# Patient Record
Sex: MALE | Race: WHITE | NOT HISPANIC OR LATINO | URBAN - METROPOLITAN AREA
[De-identification: names, ages, dates, MRNs, and addresses within clinical notes are randomized per-mention and may not be internally consistent; named-entity substitution may affect disease eponyms.]

---

## 2021-05-14 ENCOUNTER — IMMUNIZATIONS (OUTPATIENT)
Dept: FAMILY MEDICINE CLINIC | Facility: HOSPITAL | Age: 71
End: 2021-05-14

## 2021-05-14 DIAGNOSIS — Z23 ENCOUNTER FOR IMMUNIZATION: Primary | ICD-10-CM

## 2021-05-14 PROCEDURE — 91300 SARS-COV-2 / COVID-19 MRNA VACCINE (PFIZER-BIONTECH) 30 MCG: CPT

## 2021-05-14 PROCEDURE — 0001A SARS-COV-2 / COVID-19 MRNA VACCINE (PFIZER-BIONTECH) 30 MCG: CPT

## 2021-06-06 ENCOUNTER — IMMUNIZATIONS (OUTPATIENT)
Dept: FAMILY MEDICINE CLINIC | Facility: HOSPITAL | Age: 71
End: 2021-06-06

## 2021-06-06 DIAGNOSIS — Z23 ENCOUNTER FOR IMMUNIZATION: Primary | ICD-10-CM

## 2021-06-06 PROCEDURE — 91300 SARS-COV-2 / COVID-19 MRNA VACCINE (PFIZER-BIONTECH) 30 MCG: CPT

## 2021-06-06 PROCEDURE — 0002A SARS-COV-2 / COVID-19 MRNA VACCINE (PFIZER-BIONTECH) 30 MCG: CPT

## 2024-07-01 ENCOUNTER — OFFICE VISIT (OUTPATIENT)
Age: 74
End: 2024-07-01
Payer: COMMERCIAL

## 2024-07-01 VITALS
WEIGHT: 207.6 LBS | HEART RATE: 75 BPM | OXYGEN SATURATION: 95 % | DIASTOLIC BLOOD PRESSURE: 60 MMHG | BODY MASS INDEX: 29.06 KG/M2 | SYSTOLIC BLOOD PRESSURE: 110 MMHG | HEIGHT: 71 IN | TEMPERATURE: 96.2 F

## 2024-07-01 DIAGNOSIS — I10 HYPERTENSION, UNSPECIFIED TYPE: ICD-10-CM

## 2024-07-01 DIAGNOSIS — J30.81 ALLERGIC RHINITIS DUE TO ANIMAL HAIR AND DANDER: ICD-10-CM

## 2024-07-01 DIAGNOSIS — J45.909 UNCOMPLICATED ASTHMA, UNSPECIFIED ASTHMA SEVERITY, UNSPECIFIED WHETHER PERSISTENT: Primary | ICD-10-CM

## 2024-07-01 DIAGNOSIS — M15.9 OSTEOARTHRITIS OF MULTIPLE JOINTS, UNSPECIFIED OSTEOARTHRITIS TYPE: ICD-10-CM

## 2024-07-01 PROCEDURE — G2211 COMPLEX E/M VISIT ADD ON: HCPCS | Performed by: INTERNAL MEDICINE

## 2024-07-01 PROCEDURE — 99204 OFFICE O/P NEW MOD 45 MIN: CPT | Performed by: INTERNAL MEDICINE

## 2024-07-01 RX ORDER — DIPHENOXYLATE HYDROCHLORIDE AND ATROPINE SULFATE 2.5; .025 MG/1; MG/1
1 TABLET ORAL DAILY
COMMUNITY

## 2024-07-01 RX ORDER — MONTELUKAST SODIUM 10 MG/1
10 TABLET ORAL
COMMUNITY

## 2024-07-01 RX ORDER — VALSARTAN AND HYDROCHLOROTHIAZIDE 160; 25 MG/1; MG/1
1 TABLET ORAL DAILY
COMMUNITY

## 2024-07-01 RX ORDER — ALBUTEROL SULFATE 90 UG/1
2 AEROSOL, METERED RESPIRATORY (INHALATION) EVERY 6 HOURS PRN
Qty: 18 G | Refills: 2 | Status: SHIPPED | OUTPATIENT
Start: 2024-07-01

## 2024-07-01 RX ORDER — FLUTICASONE FUROATE AND VILANTEROL 200; 25 UG/1; UG/1
1 POWDER RESPIRATORY (INHALATION) DAILY
Qty: 60 BLISTER | Refills: 2 | Status: SHIPPED | OUTPATIENT
Start: 2024-07-01 | End: 2024-09-29

## 2024-07-01 RX ORDER — FLUTICASONE FUROATE AND VILANTEROL 200; 25 UG/1; UG/1
1 POWDER RESPIRATORY (INHALATION) DAILY
COMMUNITY
End: 2024-07-01 | Stop reason: SDUPTHER

## 2024-07-01 NOTE — PROGRESS NOTES
Pulmonary Consultation   Issa Albright 73 y.o. male MRN: [id number]  7/1/2024      Chief Complaint:  Asthma    HPI:    73-year-old male with past medical history of asthma, hypertension, osteoarthritis and allergic rhinitis presents for evaluation of asthma.  Patient was previously treated at [facility] for asthma.  Patient states that he currently feels well but occasionally feels suddenly short of breath depending on exposures such as strong smells (such as Axe deodorant and Febreeze) or pollen and animal dander.  Patient denies any current or previous tobacco history.  He denies any illicit drug use.  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  No occupational exposures.  He owns cats and states that he knows that he is allergic to them.    Exercise Tolerance: Fair.  Able to do most exercise without stopping from exertion however states that when carrying heavy things upstairs he needs to stop prior to reaching to top.      Past Medical Hx  Asthma, unclear severity  Allergic rhinitis  Hypertension  Osteoarthritis      Past Surgical Hx  No thoracic surgery endorsed by patient      Family Hx  No family history of pulmonary disease per patient      Occupational History:   No known previous inhalation injuries      Social History:   Lifelong non-smoker  Denies any illicit drug use       Pertinent Meds  Flonase nasal spray daily  Breo 200 daily  Albuterol as needed      ROS:  Constitutional: - Fatigue, - chills, - fever, - weight change.   HEENT: - rhinorrhea, - sneezing, - sore throat.    Respiratory: - cough, - sputum production, + intermittent shortness of breath, - wheezing.    Cardiovascular: - chest pain,  -palpitations, - leg swelling.   Gastrointestinal: - abdominal pain, - constipation, - diarrhea, - nausea, - vomiting.   Endocrine: - cold intolerance, - heat intolerance.   Genitourinary: - dysuria.   Musculoskeletal: - arthralgias.   Skin:- rash, - wound.  "  Allergic/Immunologic: - allergies  Neurological: - dizziness, - numbness      Vitals: Blood pressure 110/60, pulse 75, temperature (!) 96.2 °F (35.7 °C), height 5' 11\" (1.803 m), weight 94.2 kg (207 lb 9.6 oz), SpO2 95%., Body mass index is 28.95 kg/m².    Physical Exam  GEN  NAD  HEENT  ncat, non icteric, MM moist  NECK  supple, no JVD, no LAD  CV  +s1s2, no mrg, RRR  PULM  CTA BL, no wrr  ABD  soft, ntnd, + BS  EXT 1+ lower extremity pitting edema, no cyanosis, no clubbing  NEURO  Aox3, no focal weakness    Imaging and other studies  No thoracic imaging available for interpretation      Pulmonary function testing:   No PFTs available for interpretation      Lab Results, EKG, Pathology, and Other Studies:  No labs available for interpretation    Assessment:  Asthma-unclear severity  Allergic rhinitis  Hypertension  Osteoarthritis    Plan:  Patient symptoms are consistent with asthma.  Check CBC with differential  Check [place] allergy panel  Check chest x-ray  Check PFTs with 6-minute walk test  Continue Breo 200 daily.  Reorder for 3 months.  Continue to use albuterol as needed.  Reordered for at least 3 months.  Continue Flonase nasal spray daily for allergic rhinitis  Will defer management of hypertension and osteoarthritis to patient's primary care physician.  Patient currently using valsartan-HCTZ as well as acetaminophen as needed for these diagnoses, respectively  Patient will call us for any changes or worsening of pulmonary symptoms prior to next planned visit.    Return visit in 4 to 6 weeks  On next visit we will evaluate symptoms, albuterol requirement, CBC results, [facility] allergy panel results, chest x-ray results, PFT results, compliance with Breo    Note: Portions of the record may have been created with voice recognition software. Occasional wrong word or \"sound a like\" substitutions may have occurred due to the inherent limitations of voice recognition software. Read the chart carefully and " recognize, using context, where substitutions have occurred.     Mark Medina M.D.  [facility]

## 2024-09-12 DIAGNOSIS — J45.909 UNCOMPLICATED ASTHMA, UNSPECIFIED ASTHMA SEVERITY, UNSPECIFIED WHETHER PERSISTENT: ICD-10-CM

## 2024-09-12 NOTE — TELEPHONE ENCOUNTER
Albuterol as needed for wheezing last ordered 07.2024 with refills - Please review to see if the refill is appropriate.

## 2024-09-16 RX ORDER — ALBUTEROL SULFATE 90 UG/1
INHALANT RESPIRATORY (INHALATION)
Qty: 8.5 G | Refills: 2 | Status: SHIPPED | OUTPATIENT
Start: 2024-09-16

## 2024-09-22 DIAGNOSIS — J45.909 UNCOMPLICATED ASTHMA, UNSPECIFIED ASTHMA SEVERITY, UNSPECIFIED WHETHER PERSISTENT: ICD-10-CM

## 2024-09-23 RX ORDER — FLUTICASONE FUROATE AND VILANTEROL TRIFENATATE 200; 25 UG/1; UG/1
POWDER RESPIRATORY (INHALATION)
Qty: 60 EACH | Refills: 2 | Status: SHIPPED | OUTPATIENT
Start: 2024-09-23

## 2024-09-27 ENCOUNTER — HOSPITAL ENCOUNTER (OUTPATIENT)
Dept: PULMONOLOGY | Facility: HOSPITAL | Age: 74
End: 2024-09-27
Attending: INTERNAL MEDICINE
Payer: COMMERCIAL

## 2024-09-27 ENCOUNTER — APPOINTMENT (OUTPATIENT)
Dept: LAB | Facility: HOSPITAL | Age: 74
End: 2024-09-27
Payer: COMMERCIAL

## 2024-09-27 ENCOUNTER — HOSPITAL ENCOUNTER (OUTPATIENT)
Dept: RADIOLOGY | Facility: HOSPITAL | Age: 74
End: 2024-09-27
Payer: COMMERCIAL

## 2024-09-27 DIAGNOSIS — J45.909 UNCOMPLICATED ASTHMA, UNSPECIFIED ASTHMA SEVERITY, UNSPECIFIED WHETHER PERSISTENT: ICD-10-CM

## 2024-09-27 DIAGNOSIS — J45.909 ASTHMATIC BRONCHITIS WITHOUT COMPLICATION, UNSPECIFIED ASTHMA SEVERITY, UNSPECIFIED WHETHER PERSISTENT: ICD-10-CM

## 2024-09-27 LAB
BASOPHILS # BLD AUTO: 0.03 THOUSANDS/ΜL (ref 0–0.1)
BASOPHILS NFR BLD AUTO: 1 % (ref 0–1)
EOSINOPHIL # BLD AUTO: 0.21 THOUSAND/ΜL (ref 0–0.61)
EOSINOPHIL NFR BLD AUTO: 4 % (ref 0–6)
ERYTHROCYTE [DISTWIDTH] IN BLOOD BY AUTOMATED COUNT: 12.7 % (ref 11.6–15.1)
HCT VFR BLD AUTO: 44.1 % (ref 36.5–49.3)
HGB BLD-MCNC: 14.7 G/DL (ref 12–17)
IMM GRANULOCYTES # BLD AUTO: 0.03 THOUSAND/UL (ref 0–0.2)
IMM GRANULOCYTES NFR BLD AUTO: 1 % (ref 0–2)
LYMPHOCYTES # BLD AUTO: 1.34 THOUSANDS/ΜL (ref 0.6–4.47)
LYMPHOCYTES NFR BLD AUTO: 26 % (ref 14–44)
MCH RBC QN AUTO: 31.7 PG (ref 26.8–34.3)
MCHC RBC AUTO-ENTMCNC: 33.3 G/DL (ref 31.4–37.4)
MCV RBC AUTO: 95 FL (ref 82–98)
MONOCYTES # BLD AUTO: 0.3 THOUSAND/ΜL (ref 0.17–1.22)
MONOCYTES NFR BLD AUTO: 6 % (ref 4–12)
NEUTROPHILS # BLD AUTO: 3.24 THOUSANDS/ΜL (ref 1.85–7.62)
NEUTS SEG NFR BLD AUTO: 62 % (ref 43–75)
NRBC BLD AUTO-RTO: 0 /100 WBCS
PLATELET # BLD AUTO: 175 THOUSANDS/UL (ref 149–390)
PMV BLD AUTO: 10.2 FL (ref 8.9–12.7)
RBC # BLD AUTO: 4.64 MILLION/UL (ref 3.88–5.62)
WBC # BLD AUTO: 5.15 THOUSAND/UL (ref 4.31–10.16)

## 2024-09-27 PROCEDURE — 85025 COMPLETE CBC W/AUTO DIFF WBC: CPT

## 2024-09-27 PROCEDURE — 94618 PULMONARY STRESS TESTING: CPT

## 2024-09-27 PROCEDURE — 94729 DIFFUSING CAPACITY: CPT | Performed by: INTERNAL MEDICINE

## 2024-09-27 PROCEDURE — 86003 ALLG SPEC IGE CRUDE XTRC EA: CPT

## 2024-09-27 PROCEDURE — 94726 PLETHYSMOGRAPHY LUNG VOLUMES: CPT

## 2024-09-27 PROCEDURE — 71046 X-RAY EXAM CHEST 2 VIEWS: CPT

## 2024-09-27 PROCEDURE — 94726 PLETHYSMOGRAPHY LUNG VOLUMES: CPT | Performed by: INTERNAL MEDICINE

## 2024-09-27 PROCEDURE — 94060 EVALUATION OF WHEEZING: CPT

## 2024-09-27 PROCEDURE — 82785 ASSAY OF IGE: CPT

## 2024-09-27 PROCEDURE — 94060 EVALUATION OF WHEEZING: CPT | Performed by: INTERNAL MEDICINE

## 2024-09-27 PROCEDURE — 94729 DIFFUSING CAPACITY: CPT

## 2024-09-27 PROCEDURE — 94618 PULMONARY STRESS TESTING: CPT | Performed by: INTERNAL MEDICINE

## 2024-09-27 PROCEDURE — 36415 COLL VENOUS BLD VENIPUNCTURE: CPT

## 2024-09-27 RX ORDER — ALBUTEROL SULFATE 0.83 MG/ML
2.5 SOLUTION RESPIRATORY (INHALATION) ONCE
Status: COMPLETED | OUTPATIENT
Start: 2024-09-27 | End: 2024-09-27

## 2024-09-27 RX ADMIN — ALBUTEROL SULFATE 2.5 MG: 2.5 SOLUTION RESPIRATORY (INHALATION) at 10:04

## 2024-09-29 LAB
A ALTERNATA IGE QN: <0.1 KUA/I
A FUMIGATUS IGE QN: 0.17 KUA/I
BERMUDA GRASS IGE QN: 0.33 KUA/I
BOXELDER IGE QN: <0.1 KUA/I
C HERBARUM IGE QN: <0.1 KUA/I
CAT DANDER IGE QN: 4.95 KUA/I
CMN PIGWEED IGE QN: <0.1 KUA/I
COMMON RAGWEED IGE QN: <0.1 KUA/I
COTTONWOOD IGE QN: <0.1 KUA/I
D FARINAE IGE QN: 11.9 KUA/I
D PTERONYSS IGE QN: 14.7 KUA/I
DOG DANDER IGE QN: 0.32 KUA/I
LONDON PLANE IGE QN: <0.1 KUA/I
MOUSE URINE PROT IGE QN: <0.1 KUA/I
MT JUNIPER IGE QN: <0.1 KUA/I
MUGWORT IGE QN: <0.1 KUA/I
P NOTATUM IGE QN: <0.1 KUA/I
ROACH IGE QN: <0.1 KUA/I
SHEEP SORREL IGE QN: <0.1 KUA/I
SILVER BIRCH IGE QN: <0.1 KUA/I
TIMOTHY IGE QN: 6.26 KUA/I
TOTAL IGE SMQN RAST: 120 KU/L (ref 0–113)
WALNUT IGE QN: <0.1 KUA/I
WHITE ASH IGE QN: 1.1 KUA/I
WHITE ELM IGE QN: <0.1 KUA/I
WHITE MULBERRY IGE QN: <0.1 KUA/I
WHITE OAK IGE QN: <0.1 KUA/I

## 2024-10-18 ENCOUNTER — OFFICE VISIT (OUTPATIENT)
Age: 74
End: 2024-10-18
Payer: COMMERCIAL

## 2024-10-18 VITALS
TEMPERATURE: 96.9 F | OXYGEN SATURATION: 98 % | SYSTOLIC BLOOD PRESSURE: 136 MMHG | DIASTOLIC BLOOD PRESSURE: 90 MMHG | HEART RATE: 89 BPM | BODY MASS INDEX: 28.73 KG/M2 | WEIGHT: 206 LBS

## 2024-10-18 DIAGNOSIS — J45.40 MODERATE PERSISTENT ASTHMA WITHOUT COMPLICATION: Primary | ICD-10-CM

## 2024-10-18 DIAGNOSIS — J30.81 ALLERGIC RHINITIS DUE TO ANIMAL HAIR AND DANDER: ICD-10-CM

## 2024-10-18 DIAGNOSIS — I10 PRIMARY HYPERTENSION: ICD-10-CM

## 2024-10-18 PROCEDURE — 99214 OFFICE O/P EST MOD 30 MIN: CPT | Performed by: NURSE PRACTITIONER

## 2024-10-18 PROCEDURE — G2211 COMPLEX E/M VISIT ADD ON: HCPCS | Performed by: NURSE PRACTITIONER

## 2024-10-18 NOTE — ASSESSMENT & PLAN NOTE
Absolute eosinophils #210, IgE 120  He has some reversibility on PFT consistent with asthma and some air trapping is identified - this may be the main  for his shortness of breath.  Asthma is well controlled presently and he has rare need for rescue inhaler. No exacerbation since last visit.  Continue Breo  Albuterol HFA up to 4x per day if needed  We talked about trying to diminish allergen exposure to avoid pulmonary triggers  I gave incentive spirometry and instructions on how to use; He is asked to use several times per day.

## 2024-10-18 NOTE — PATIENT INSTRUCTIONS
You have a diagnosis of allergy to pet dander.  Keep pets out of bedroom 100%.  Keep bedroom door closed.  Consider an air filter or HEPA filter in common areas of the home.  After touching or handling animals, wash your skin immediately.  Speak with  about introducing foods that are allergen-reducing for your pet.    You are diagnosed with perennial allergies - dust mite allergy  Wash sheets and towels weekly in hot water and hot dryer.  Consider covering your bed pillows in allergy pillow cases; mattress cases are also available.  Consider a HEPA filter or air filter at your bedside.  Try to keep ambient humidity between 40-60%. Too moist means potential mold development, and too dry means dust mite dander becomes more airborne.    You have a diagnosis of seasonal allergies. You are allergic to pollen which has a blooming season that is predictable each year. Spring pollens: March 15 to July 1 annually  During the pollen season please do not hang clothes out to dry outdoors  Consider keeping the air conditioner on during your pollen season to provide air filtration  If spending time outdoors, wash eyelashes after coming indoors with warm water, mild soap, and a soft cloth.  Do not re-wear hooded clothes without washing pollen off.  Tie back hair, away from the face, prior to going outside. Wear sunglasses to protect your eyes.  Wipe down car interior surfaces and change cabin air filter per  recommendations.

## 2024-10-18 NOTE — PROGRESS NOTES
Pulmonary Follow-Up Note   Issa Albright 73 y.o. male MRN: [id number]  10/18/2024      Assessment/Plan:    Problem List Items Addressed This Visit       Uncomplicated asthma - Primary     Absolute eosinophils #210, IgE 120  He has some reversibility on PFT consistent with asthma and some air trapping is identified - this may be the main  for his shortness of breath.  Asthma is well controlled presently and he has rare need for rescue inhaler. No exacerbation since last visit.  Continue Breo  Albuterol HFA up to 4x per day if needed  We talked about trying to diminish allergen exposure to avoid pulmonary triggers  I gave incentive spirometry and instructions on how to use; He is asked to use several times per day.         Hypertension     On Valsartan/HCTZ; follow with PCP         Allergic rhinitis due to animal hair and dander     Serum IgE testing reveals positives for grass pollen, tree pollen, dust mite, and cat dander. Borderline positive for aspergillus mold and dog dander.    We did review allergy avoidance measures today - add dust mite to pillows, keep cats out of bedroom, etc.    Continue Montelukast and Flonase daily          Vaccines: recommend flu shot - he will get at pharmacy    Return in about 6 months (around 4/18/2025).    All of Issa' questions were answered prior to leaving the office today.  He is aware to call our office with any further questions or concerns.    History of Present Illness   Reason for Visit: Follow up  Chief Complaint: asthma  HPI: Issa Albright is a 73 y.o. male who presents to the office today following up; he has a PMHx asthma, HTN, OA, and allergic rhinitis.    He describes some shortness of breath with exertion - out in the garden etc. Also has a slight cough from time to time but does not take cough medicine. He is unsure of wheezing. He is sleeping well. Keeps the cats out of his bedroom. Rescue inhaler is used about 3-4 times per week.    Review of  Systems  Please note that a 14-point review of systems was performed to include Constitutional, HEENT, Respiratory, CVS, GI, , Musculoskeletal, Integumentary, Neurologic, Rheumatologic, Endocrinologic, Psychiatric, Lymphatic, and Hematologic/Oncologic systems were reviewed and are negative unless otherwise stated in HPI. Positive and negative findings pertinent to this evaluation are incorporated into the history of present illness.       Historical Information   Past Medical History:   Diagnosis Date    Asthma     Hypertension      Past Surgical History:   Procedure Laterality Date    INGUINAL HERNIA REPAIR  1980     Family History   Problem Relation Age of Onset    Lung cancer Father     Asthma Brother      Social History   Social History     Substance and Sexual Activity   Alcohol Use Yes    Comment: occassions     Social History     Substance and Sexual Activity   Drug Use Never     Social History     Tobacco Use   Smoking Status Never    Passive exposure: Past   Smokeless Tobacco Never     E-Cigarette/Vaping    E-Cigarette Use Never User      E-Cigarette/Vaping Substances    Nicotine No     THC No     CBD No     Flavoring No     Other No     Unknown No        Meds/Allergies     Current Outpatient Medications:     albuterol (PROVENTIL HFA,VENTOLIN HFA) 90 mcg/act inhaler, INHALE 2 PUFFS EVERY 6 HOURS AS NEEDED FOR WHEEZING, Disp: 8.5 g, Rfl: 2    Breo Ellipta 200-25 MCG/ACT inhaler, INHALE 1 PUFF DAILY RINSE MOUTH AFTER USE., Disp: 60 each, Rfl: 2    fluticasone (VERAMYST) 27.5 MCG/SPRAY nasal spray, 2 sprays into each nostril daily, Disp: , Rfl:     montelukast (SINGULAIR) 10 mg tablet, Take 10 mg by mouth daily at bedtime, Disp: , Rfl:     multivitamin (THERAGRAN) TABS, Take 1 tablet by mouth daily, Disp: , Rfl:     valsartan-hydrochlorothiazide (DIOVAN-HCT) 160-25 MG per tablet, Take 1 tablet by mouth daily, Disp: , Rfl:   No Known Allergies    Vitals: Blood pressure 136/90, pulse 89, temperature (!) 96.9  °F (36.1 °C), weight 93.4 kg (206 lb), SpO2 98%. Body mass index is 28.73 kg/m². Oxygen Therapy  SpO2: 98 %      Physical Exam  Vitals reviewed.   Constitutional:       Appearance: Normal appearance.   HENT:      Head: Normocephalic.      Nose: Nose normal.      Mouth/Throat:      Mouth: Mucous membranes are moist.      Pharynx: Oropharynx is clear.   Cardiovascular:      Rate and Rhythm: Normal rate and regular rhythm.      Pulses: Normal pulses.      Heart sounds: Normal heart sounds.   Pulmonary:      Effort: Pulmonary effort is normal.      Breath sounds: Normal breath sounds.   Musculoskeletal:         General: Normal range of motion.   Skin:     General: Skin is warm.      Capillary Refill: Capillary refill takes less than 2 seconds.   Neurological:      General: No focal deficit present.      Mental Status: He is alert and oriented to person, place, and time. Mental status is at baseline.   Psychiatric:         Mood and Affect: Mood normal.         Behavior: Behavior normal.         Labs:   Component      Latest Ref [facility] 9/27/2024   ALTERNARIA ALTERNATA      0.00 - 0.09 kUA/I <0.10    ASPERGILLUS FUMIGATUS      0.00 - 0.09 kUA/I 0.17 (H)    BERMUDA GRASS      0.00 - 0.09 kUA/I 0.33 (H)    ALLERGEN MAPLE/BOX ELDER      0.00 - 0.09 kUA/I <0.10    ALLERGEN CAT EPITHELIUM-DANDER      0.00 - 0.09 kUA/I 4.95 (H)    CLADOSPORIUM HERBARUM      0.00 - 0.09 kUA/I <0.10    COCKROACH      0.00 - 0.09 kUA/I <0.10    ALLERGEN, COMMON SILVER BIRCH      0.00 - 0.09 kUA/I <0.10    ALLERGEN, COTTONWOOD      0.00 - 0.09 kUA/I <0.10    D. FARINAE      0.00 - 0.09 kUA/I 11.90 (H)    D. PTERONYSSINUS      0.00 - 0.09 kUA/I 14.70 (H)    DOG DANDER      0.00 - 0.09 kUA/I 0.32 (H)    ALLERGEN ELM      0.00 - 0.09 kUA/I <0.10    M[place]AIN [place]AR TREE      0.00 - 0.09 kUA/I <0.10    ALLERGEN MUGWORT IGE      0.00 - 0.09 kUA/I <0.10    MULBERRY TREE      0.00 - 0.09 kUA/I <0.10    ALLERGEN, OAK      0.00 - 0.09 kUA/I <0.10    PENICILLIUM  CHRYSOGENUM      0.00 - 0.09 kUA/I <0.10    ALLERGEN ROUGH PIGWEED (W14) IGE      0.00 - 0.09 kUA/I <0.10    COMMON RAGWEED      0.00 - 0.09 kUA/I <0.10    ALLERGEN SHEEP SORREL (W18) IGE      0.00 - 0.09 kUA/I <0.10    [place] TREE      0.00 - 0.09 kUA/I <0.10    ECTOR GRASS      0.00 - 0.09 kUA/I 6.26 (H)    [place] TREE      0.00 - 0.09 kUA/I <0.10    WHITE K[place]AN TREE      0.00 - 0.09 kUA/I 1.10 (H)    TOTAL IGE      0 - 113 kU/l 120 (H)    MOUSE URINE      0.00 - 0.09 kUA/I <0.10    WBC      4.31 - 10.16 Thousand/uL 5.15    RBC      3.88 - 5.62 Million/uL 4.64    Hemoglobin      12.0 - 17.0 g/dL 14.7    Hematocrit      36.5 - 49.3 % 44.1    MCV      82 - 98 fL 95    MCH      26.8 - 34.3 pg 31.7    MCHC      31.4 - 37.4 g/dL 33.3    RDW      11.6 - 15.1 % 12.7    MPV      8.9 - 12.7 fL 10.2    Platelet Count      149 - 390 Thousands/uL 175    nRBC      /100 WBCs 0    Segmented %      43 - 75 % 62    Immature Grans %      0 - 2 % 1    Lymphocytes %      14 - 44 % 26    Monocytes %      4 - 12 % 6    Eosinophils %      0 - 6 % 4    Basophils %      0 - 1 % 1    Absolute Neutrophils      1.85 - 7.62 Thousands/µL 3.24    Absolute Immature Grans      0.00 - 0.20 Thousand/uL 0.03    Absolute Lymphocytes      0.60 - 4.47 Thousands/µL 1.34    Absolute Monocytes      0.17 - 1.22 Thousand/µL 0.30    Absolute Eosinophils      0.00 - 0.61 Thousand/µL 0.21    Absolute Basophils      0.00 - 0.10 Thousands/µL 0.03       Legend:  (H) High    Imaging and other studies: Results Review Statement: I reviewed radiology reports from this admission including: xray(s).  CXR 9/27/24  Mild linear atelectasis in the middle lobe.  Hyperinflated lungs which could reflect underlying emphysema although nonspecific.  Apparent minimal right pleural effusion.  No airspace infiltrates or pulmonary edema    Pulmonary Results (PFTs, PSG):   PFT 9/27/24  Interpretation:     Moderate obstructive airflow defect on spirometry.     Positive post  "bronchodilator response.     Air trapping and hyperinflation as indicated by the lung volumes.     Moderate decrease in corrected diffusion capacity.     Flow-volume loop consistent with obstruction     6-minute walk test: Baseline SpO2 99%.  Patient walked 233 meters without stopping with lowest SpO2 91% on room air and highest heart rate of 94 BPM.  No supplemental oxygen needed.          JON Man  [facility]        Portions of the record may have been created with voice recognition software.  Occasional wrong word or \"sound a like\" substitutions may have occurred due to the inherent limitations of voice recognition software.  Read the chart carefully and recognize, using context, where substitutions have occurred or contact the dictating provider.  "

## 2024-10-18 NOTE — ASSESSMENT & PLAN NOTE
Serum IgE testing reveals positives for grass pollen, tree pollen, dust mite, and cat dander. Borderline positive for aspergillus mold and dog dander.    We did review allergy avoidance measures today - add dust mite to pillows, keep cats out of bedroom, etc.    Continue Montelukast and Flonase daily

## 2025-01-04 DIAGNOSIS — J45.909 UNCOMPLICATED ASTHMA, UNSPECIFIED ASTHMA SEVERITY, UNSPECIFIED WHETHER PERSISTENT: ICD-10-CM

## 2025-01-06 RX ORDER — FLUTICASONE FUROATE AND VILANTEROL TRIFENATATE 200; 25 UG/1; UG/1
POWDER RESPIRATORY (INHALATION)
Qty: 60 EACH | Refills: 2 | Status: SHIPPED | OUTPATIENT
Start: 2025-01-06

## 2025-07-25 ENCOUNTER — RESULTS FOLLOW-UP (OUTPATIENT)
Age: 75
End: 2025-07-25

## 2025-07-25 LAB
ALBUMIN SERPL-MCNC: 4.6 G/DL (ref 3.8–4.8)
ALP SERPL-CCNC: 37 IU/L (ref 44–121)
ALT SERPL-CCNC: 27 IU/L (ref 0–44)
AST SERPL-CCNC: 22 IU/L (ref 0–40)
BASOPHILS # BLD AUTO: 0 X10E3/UL (ref 0–0.2)
BASOPHILS NFR BLD AUTO: 1 %
BILIRUB SERPL-MCNC: 0.5 MG/DL (ref 0–1.2)
BUN SERPL-MCNC: 14 MG/DL (ref 8–27)
BUN/CREAT SERPL: 15 (ref 10–24)
CALCIUM SERPL-MCNC: 9.4 MG/DL (ref 8.6–10.2)
CHLORIDE SERPL-SCNC: 102 MMOL/L (ref 96–106)
CHOLEST SERPL-MCNC: 193 MG/DL (ref 100–199)
CO2 SERPL-SCNC: 23 MMOL/L (ref 20–29)
CREAT SERPL-MCNC: 0.93 MG/DL (ref 0.76–1.27)
EGFR: 85 ML/MIN/1.73
EOSINOPHIL # BLD AUTO: 0.2 X10E3/UL (ref 0–0.4)
EOSINOPHIL NFR BLD AUTO: 5 %
ERYTHROCYTE [DISTWIDTH] IN BLOOD BY AUTOMATED COUNT: 13.1 % (ref 11.6–15.4)
GLOBULIN SER-MCNC: 2.3 G/DL (ref 1.5–4.5)
GLUCOSE SERPL-MCNC: 118 MG/DL (ref 70–99)
HBA1C MFR BLD: 6.4 % (ref 4.8–5.6)
HCT VFR BLD AUTO: 42.1 % (ref 37.5–51)
HDLC SERPL-MCNC: 45 MG/DL
HGB BLD-MCNC: 13.8 G/DL (ref 13–17.7)
IMM GRANULOCYTES # BLD: 0 X10E3/UL (ref 0–0.1)
IMM GRANULOCYTES NFR BLD: 1 %
LDLC SERPL CALC-MCNC: 125 MG/DL (ref 0–99)
LDLC/HDLC SERPL: 2.8 RATIO (ref 0–3.6)
LYMPHOCYTES # BLD AUTO: 1.2 X10E3/UL (ref 0.7–3.1)
LYMPHOCYTES NFR BLD AUTO: 31 %
MCH RBC QN AUTO: 31.9 PG (ref 26.6–33)
MCHC RBC AUTO-ENTMCNC: 32.8 G/DL (ref 31.5–35.7)
MCV RBC AUTO: 98 FL (ref 79–97)
MONOCYTES # BLD AUTO: 0.3 X10E3/UL (ref 0.1–0.9)
MONOCYTES NFR BLD AUTO: 7 %
NEUTROPHILS # BLD AUTO: 2.1 X10E3/UL (ref 1.4–7)
NEUTROPHILS NFR BLD AUTO: 55 %
PLATELET # BLD AUTO: 182 X10E3/UL (ref 150–450)
POTASSIUM SERPL-SCNC: 3.9 MMOL/L (ref 3.5–5.2)
PROT SERPL-MCNC: 6.9 G/DL (ref 6–8.5)
RBC # BLD AUTO: 4.32 X10E6/UL (ref 4.14–5.8)
SL AMB VLDL CHOLESTEROL CALC: 23 MG/DL (ref 5–40)
SODIUM SERPL-SCNC: 142 MMOL/L (ref 134–144)
TRIGL SERPL-MCNC: 129 MG/DL (ref 0–149)
WBC # BLD AUTO: 3.8 X10E3/UL (ref 3.4–10.8)

## 2025-07-25 NOTE — RESULT ENCOUNTER NOTE
Please call patient and let the patient know that the lab results are normal and follow-up can occur as planned.